# Patient Record
Sex: FEMALE | Race: WHITE | NOT HISPANIC OR LATINO | Employment: FULL TIME | URBAN - METROPOLITAN AREA
[De-identification: names, ages, dates, MRNs, and addresses within clinical notes are randomized per-mention and may not be internally consistent; named-entity substitution may affect disease eponyms.]

---

## 2018-11-07 ENCOUNTER — OFFICE VISIT (OUTPATIENT)
Dept: URGENT CARE | Facility: CLINIC | Age: 23
End: 2018-11-07
Payer: COMMERCIAL

## 2018-11-07 VITALS
HEIGHT: 66 IN | DIASTOLIC BLOOD PRESSURE: 80 MMHG | RESPIRATION RATE: 16 BRPM | WEIGHT: 220 LBS | HEART RATE: 76 BPM | SYSTOLIC BLOOD PRESSURE: 110 MMHG | TEMPERATURE: 98.3 F | BODY MASS INDEX: 35.36 KG/M2 | OXYGEN SATURATION: 94 %
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
## 2018-11-07 PROCEDURE — 99203 OFFICE O/P NEW LOW 30 MIN: CPT | Performed by: FAMILY MEDICINE

## 2018-11-07 ASSESSMENT — ENCOUNTER SYMPTOMS
FEVER: 0
DIARRHEA: 0
ABDOMINAL PAIN: 0
CONSTIPATION: 0
CHILLS: 0
NAUSEA: 0
VOMITING: 0
COUGH: 0
SHORTNESS OF BREATH: 0

## 2018-11-07 NOTE — PROGRESS NOTES
"Subjective:   Coco Casper is a 23 y.o. female who presents for Spider Bite (sensitive to the touch, painful )      HPI   Patient presents with new onset spider bite to her left lateral thigh near the buttock area that she noticed yesterday. This is a new problem.  She states it is tender to the touch, with no drainage and has almost completely scabbed over. Denies fever chills or itching to the area. Has not tried any OTC creams. Denies aggravating factors and rates as mild in severity.      Review of Systems   Constitutional: Negative for chills and fever.   Respiratory: Negative for cough and shortness of breath.    Cardiovascular: Negative for chest pain.   Gastrointestinal: Negative for abdominal pain, constipation, diarrhea, nausea and vomiting.   Skin: Negative for itching and rash.        Spider bite to left thigh   PMH:  has no past medical history on file.  MEDS:   Current Outpatient Prescriptions:   •  mupirocin (BACTROBAN) 2 % Ointment, Apply 1 Application to affected area(s) 2 times a day., Disp: 1 Tube, Rfl: 0  ALLERGIES: No Known Allergies  SURGHX: History reviewed. No pertinent surgical history.  SOCHX:  reports that she has never smoked. She has never used smokeless tobacco. She reports that she does not drink alcohol or use drugs.  FH: Family history was reviewed, no pertinent findings to report   Objective:   /80   Pulse 76   Temp 36.8 °C (98.3 °F)   Resp 16   Ht 1.676 m (5' 6\")   Wt 99.8 kg (220 lb)   SpO2 94%   BMI 35.51 kg/m²   Physical Exam   Constitutional: She appears well-developed and well-nourished. No distress.   HENT:   Head: Normocephalic.   Cardiovascular: Normal rate.    Pulmonary/Chest: Effort normal.   Skin: Capillary refill takes less than 2 seconds. No rash noted. She is not diaphoretic.        Small area of redness on left lateral thigh measuring approximately 1cm in diameter. No swelling, erythema, warmth or red streaking noted.  Minimal yellow drainage noted. " Area mildly tender to touch.         Assessment/Plan:   Assessment    1. Insect bite, initial encounter  - mupirocin (BACTROBAN) 2 % Ointment; Apply 1 Application to affected area(s) 2 times a day.  Dispense: 1 Tube; Refill: 0    Wound care instructions given:  Patient to apply cream to affected area twice daily and keep area covered with bandaid    Discussed s/s of worsening infection and when to return to office.    Follow up PRN    Differential diagnosis, natural history, supportive care, and indications for immediate follow-up discussed.     The case was discussed and reviewed with Dr Thomas during Landy DIEZ's training period.

## 2019-01-17 ENCOUNTER — HOSPITAL ENCOUNTER (EMERGENCY)
Facility: MEDICAL CENTER | Age: 24
End: 2019-01-17
Attending: EMERGENCY MEDICINE
Payer: COMMERCIAL

## 2019-01-17 VITALS
HEART RATE: 91 BPM | DIASTOLIC BLOOD PRESSURE: 89 MMHG | WEIGHT: 240.52 LBS | HEIGHT: 66 IN | SYSTOLIC BLOOD PRESSURE: 140 MMHG | OXYGEN SATURATION: 97 % | TEMPERATURE: 97.5 F | RESPIRATION RATE: 18 BRPM | BODY MASS INDEX: 38.66 KG/M2

## 2019-01-17 DIAGNOSIS — K29.00 ACUTE GASTRITIS WITHOUT HEMORRHAGE, UNSPECIFIED GASTRITIS TYPE: ICD-10-CM

## 2019-01-17 DIAGNOSIS — R10.13 EPIGASTRIC ABDOMINAL PAIN: ICD-10-CM

## 2019-01-17 LAB
ALBUMIN SERPL BCP-MCNC: 4.3 G/DL (ref 3.2–4.9)
ALBUMIN/GLOB SERPL: 1.3 G/DL
ALP SERPL-CCNC: 63 U/L (ref 30–99)
ALT SERPL-CCNC: 22 U/L (ref 2–50)
ANION GAP SERPL CALC-SCNC: 11 MMOL/L (ref 0–11.9)
APPEARANCE UR: CLEAR
AST SERPL-CCNC: 16 U/L (ref 12–45)
BACTERIA #/AREA URNS HPF: ABNORMAL /HPF
BASOPHILS # BLD AUTO: 0.4 % (ref 0–1.8)
BASOPHILS # BLD: 0.04 K/UL (ref 0–0.12)
BILIRUB SERPL-MCNC: 0.3 MG/DL (ref 0.1–1.5)
BILIRUB UR QL STRIP.AUTO: NEGATIVE
BUN SERPL-MCNC: 8 MG/DL (ref 8–22)
CALCIUM SERPL-MCNC: 9.7 MG/DL (ref 8.5–10.5)
CHLORIDE SERPL-SCNC: 103 MMOL/L (ref 96–112)
CO2 SERPL-SCNC: 26 MMOL/L (ref 20–33)
COLOR UR: YELLOW
CREAT SERPL-MCNC: 0.6 MG/DL (ref 0.5–1.4)
EOSINOPHIL # BLD AUTO: 0.26 K/UL (ref 0–0.51)
EOSINOPHIL NFR BLD: 2.4 % (ref 0–6.9)
EPI CELLS #/AREA URNS HPF: ABNORMAL /HPF
ERYTHROCYTE [DISTWIDTH] IN BLOOD BY AUTOMATED COUNT: 43.5 FL (ref 35.9–50)
GLOBULIN SER CALC-MCNC: 3.2 G/DL (ref 1.9–3.5)
GLUCOSE SERPL-MCNC: 75 MG/DL (ref 65–99)
GLUCOSE UR STRIP.AUTO-MCNC: NEGATIVE MG/DL
HCG SERPL QL: NEGATIVE
HCT VFR BLD AUTO: 43.5 % (ref 37–47)
HGB BLD-MCNC: 14.5 G/DL (ref 12–16)
HYALINE CASTS #/AREA URNS LPF: ABNORMAL /LPF
IMM GRANULOCYTES # BLD AUTO: 0.03 K/UL (ref 0–0.11)
IMM GRANULOCYTES NFR BLD AUTO: 0.3 % (ref 0–0.9)
KETONES UR STRIP.AUTO-MCNC: 15 MG/DL
LEUKOCYTE ESTERASE UR QL STRIP.AUTO: ABNORMAL
LIPASE SERPL-CCNC: 19 U/L (ref 11–82)
LYMPHOCYTES # BLD AUTO: 3.45 K/UL (ref 1–4.8)
LYMPHOCYTES NFR BLD: 31.4 % (ref 22–41)
MCH RBC QN AUTO: 29.4 PG (ref 27–33)
MCHC RBC AUTO-ENTMCNC: 33.3 G/DL (ref 33.6–35)
MCV RBC AUTO: 88.1 FL (ref 81.4–97.8)
MICRO URNS: ABNORMAL
MONOCYTES # BLD AUTO: 0.8 K/UL (ref 0–0.85)
MONOCYTES NFR BLD AUTO: 7.3 % (ref 0–13.4)
NEUTROPHILS # BLD AUTO: 6.41 K/UL (ref 2–7.15)
NEUTROPHILS NFR BLD: 58.2 % (ref 44–72)
NITRITE UR QL STRIP.AUTO: NEGATIVE
NRBC # BLD AUTO: 0 K/UL
NRBC BLD-RTO: 0 /100 WBC
PH UR STRIP.AUTO: 6 [PH]
PLATELET # BLD AUTO: 361 K/UL (ref 164–446)
PMV BLD AUTO: 9.6 FL (ref 9–12.9)
POTASSIUM SERPL-SCNC: 4 MMOL/L (ref 3.6–5.5)
PROT SERPL-MCNC: 7.5 G/DL (ref 6–8.2)
PROT UR QL STRIP: NEGATIVE MG/DL
RBC # BLD AUTO: 4.94 M/UL (ref 4.2–5.4)
RBC # URNS HPF: ABNORMAL /HPF
RBC UR QL AUTO: NEGATIVE
SODIUM SERPL-SCNC: 140 MMOL/L (ref 135–145)
SP GR UR STRIP.AUTO: 1.02
UROBILINOGEN UR STRIP.AUTO-MCNC: 0.2 MG/DL
WBC # BLD AUTO: 11 K/UL (ref 4.8–10.8)
WBC #/AREA URNS HPF: ABNORMAL /HPF

## 2019-01-17 PROCEDURE — A9270 NON-COVERED ITEM OR SERVICE: HCPCS | Performed by: EMERGENCY MEDICINE

## 2019-01-17 PROCEDURE — 81001 URINALYSIS AUTO W/SCOPE: CPT

## 2019-01-17 PROCEDURE — 85025 COMPLETE CBC W/AUTO DIFF WBC: CPT

## 2019-01-17 PROCEDURE — 36415 COLL VENOUS BLD VENIPUNCTURE: CPT

## 2019-01-17 PROCEDURE — 83690 ASSAY OF LIPASE: CPT

## 2019-01-17 PROCEDURE — 80053 COMPREHEN METABOLIC PANEL: CPT

## 2019-01-17 PROCEDURE — 99284 EMERGENCY DEPT VISIT MOD MDM: CPT

## 2019-01-17 PROCEDURE — 700102 HCHG RX REV CODE 250 W/ 637 OVERRIDE(OP): Performed by: EMERGENCY MEDICINE

## 2019-01-17 PROCEDURE — 84703 CHORIONIC GONADOTROPIN ASSAY: CPT

## 2019-01-17 RX ORDER — FAMOTIDINE 20 MG/1
40 TABLET, FILM COATED ORAL ONCE
Status: COMPLETED | OUTPATIENT
Start: 2019-01-17 | End: 2019-01-17

## 2019-01-17 RX ORDER — FAMOTIDINE 20 MG/1
20 TABLET, FILM COATED ORAL 2 TIMES DAILY
Qty: 60 TAB | Refills: 0 | Status: SHIPPED | OUTPATIENT
Start: 2019-01-17 | End: 2020-09-01

## 2019-01-17 RX ADMIN — FAMOTIDINE 40 MG: 20 TABLET ORAL at 19:23

## 2019-01-17 RX ADMIN — LIDOCAINE HYDROCHLORIDE 30 ML: 20 SOLUTION OROPHARYNGEAL at 19:23

## 2019-01-17 ASSESSMENT — PAIN SCALES - GENERAL: PAINLEVEL_OUTOF10: 6

## 2019-01-18 NOTE — ED NOTES
Discharging patient home. Verbalized understanding of discharge instructions, follow up appointments, and home care. All questions answered and all personal belongings with patient at time of discharge. Vital signs WNL. Patient given discharge information papers, prescription, and discharge assessment completed.

## 2019-01-18 NOTE — ED PROVIDER NOTES
"ED Provider Note    Scribed for Narayan Haider M.D. by Dmitri Finch. 1/17/2019  6:48 PM    Means of arrival: Walk in  History obtained from: Patient  History limited by: None    CHIEF COMPLAINT  Chief Complaint   Patient presents with   • Abdominal Pain     x 1 day, (+) N/V       HPI    Coco Casper is a 23 y.o. female presenting with intermittent epigastric pain described as \"sharp\" in quality onset yesterday that has progressively increased in severity. She states the pain is currently a 4/10 in severity, but was a 7/10 at the worst. It is exacerbated by eating. The patient endorses associated nausea and vomiting, with one episode of emesis today. She additionally reports to have mild diarrhea, but states this has been ongoing the past week since she was diagnosed with influenza, and claims the diarrhea has improved since onset. The patient reports no past abdominal surgeries. She has not recently traveled outside the country or consumed any new foods. The patient does not drink alcohol heavily and does not take large quantities of Ibuprofen. She denies vaginal discharge, rash, dysuria, or cough.     REVIEW OF SYSTEMS  See HPI for further details.   Pertinent positives include: epigastric pain, nausea, and vomiting.  Pertinent negative include: vaginal discharge, rash, dysuria, or cough.  10 + review of systems otherwise negative     PAST MEDICAL HISTORY    Influenza    SOCIAL HISTORY  Social History     Social History Main Topics   • Smoking status: Never Smoker   • Smokeless tobacco: Never Used   • Alcohol use No   • Drug use: No       SURGICAL HISTORY  patient denies any surgical history    CURRENT MEDICATIONS  Home Medications     Reviewed by Sandra Noriega R.N. (Registered Nurse) on 01/17/19 at 1617  Med List Status: Complete   Medication Last Dose Status   mupirocin (BACTROBAN) 2 % Ointment  Active                ALLERGIES  No Known Allergies    PHYSICAL EXAM  VITAL SIGNS: /90   Pulse 75   " "Temp 36.4 °C (97.5 °F) (Temporal)   Resp 16   Ht 1.676 m (5' 6\")   Wt 109.1 kg (240 lb 8.4 oz)   LMP  (Within Weeks)   SpO2 98%   BMI 38.82 kg/m²    SpO2: I interpret this pulse oximetry as normal  Constitutional: Well developed, Well nourished, No distress, Non-toxic appearance.   HENT: Normocephalic, Atraumatic, Bilateral external ears normal, Oropharynx moist, No oral exudates.   Eyes: PERRLA, EOMI, Conjunctiva normal, No discharge.   Neck: No tenderness, Supple, No stridor.   Lymphatic: No lymphadenopathy noted.   Cardiovascular: Normal heart rate, Normal rhythm.   Thorax & Lungs: Clear to auscultation bilaterally, No respiratory distress, No wheezing, No crackles.   Abdomen: Soft, Epigastric tenderness, No masses, No pulsatile masses, no peritoneal signs.  Skin: Warm, Dry, No erythema, No rash.   Extremities:, No edema No cyanosis.   Musculoskeletal: No tenderness to palpation or major deformities noted.  Intact distal pulses  Neurologic: Awake, alert. Moves all extremities spontaneously.  Psychiatric: Affect normal, Judgment normal, Mood normal.     DIAGNOSTIC STUDIES / PROCEDURES    LABORATORY  Results for orders placed or performed during the hospital encounter of 01/17/19   CBC WITH DIFFERENTIAL   Result Value Ref Range    WBC 11.0 (H) 4.8 - 10.8 K/uL    RBC 4.94 4.20 - 5.40 M/uL    Hemoglobin 14.5 12.0 - 16.0 g/dL    Hematocrit 43.5 37.0 - 47.0 %    MCV 88.1 81.4 - 97.8 fL    MCH 29.4 27.0 - 33.0 pg    MCHC 33.3 (L) 33.6 - 35.0 g/dL    RDW 43.5 35.9 - 50.0 fL    Platelet Count 361 164 - 446 K/uL    MPV 9.6 9.0 - 12.9 fL    Neutrophils-Polys 58.20 44.00 - 72.00 %    Lymphocytes 31.40 22.00 - 41.00 %    Monocytes 7.30 0.00 - 13.40 %    Eosinophils 2.40 0.00 - 6.90 %    Basophils 0.40 0.00 - 1.80 %    Immature Granulocytes 0.30 0.00 - 0.90 %    Nucleated RBC 0.00 /100 WBC    Neutrophils (Absolute) 6.41 2.00 - 7.15 K/uL    Lymphs (Absolute) 3.45 1.00 - 4.80 K/uL    Monos (Absolute) 0.80 0.00 - 0.85 K/uL "    Eos (Absolute) 0.26 0.00 - 0.51 K/uL    Baso (Absolute) 0.04 0.00 - 0.12 K/uL    Immature Granulocytes (abs) 0.03 0.00 - 0.11 K/uL    NRBC (Absolute) 0.00 K/uL   COMP METABOLIC PANEL   Result Value Ref Range    Sodium 140 135 - 145 mmol/L    Potassium 4.0 3.6 - 5.5 mmol/L    Chloride 103 96 - 112 mmol/L    Co2 26 20 - 33 mmol/L    Anion Gap 11.0 0.0 - 11.9    Glucose 75 65 - 99 mg/dL    Bun 8 8 - 22 mg/dL    Creatinine 0.60 0.50 - 1.40 mg/dL    Calcium 9.7 8.5 - 10.5 mg/dL    AST(SGOT) 16 12 - 45 U/L    ALT(SGPT) 22 2 - 50 U/L    Alkaline Phosphatase 63 30 - 99 U/L    Total Bilirubin 0.3 0.1 - 1.5 mg/dL    Albumin 4.3 3.2 - 4.9 g/dL    Total Protein 7.5 6.0 - 8.2 g/dL    Globulin 3.2 1.9 - 3.5 g/dL    A-G Ratio 1.3 g/dL   LIPASE   Result Value Ref Range    Lipase 19 11 - 82 U/L   HCG QUAL SERUM   Result Value Ref Range    Beta-Hcg Qualitative Serum Negative Negative   URINALYSIS,CULTURE IF INDICATED   Result Value Ref Range    Color Yellow     Character Clear     Specific Gravity 1.025 <1.035    Ph 6.0 5.0 - 8.0    Glucose Negative Negative mg/dL    Ketones 15 (A) Negative mg/dL    Protein Negative Negative mg/dL    Bilirubin Negative Negative    Urobilinogen, Urine 0.2 Negative    Nitrite Negative Negative    Leukocyte Esterase Trace (A) Negative    Occult Blood Negative Negative    Micro Urine Req Microscopic    ESTIMATED GFR   Result Value Ref Range    GFR If African American >60 >60 mL/min/1.73 m 2    GFR If Non African American >60 >60 mL/min/1.73 m 2   URINE MICROSCOPIC (W/UA)   Result Value Ref Range    WBC 0-2 /hpf    RBC 0-2 /hpf    Bacteria Few (A) None /hpf    Epithelial Cells Few /hpf    Hyaline Cast 3-5 (A) /lpf         CHART REVIEW  Pertinent medical chart information was reviewed and reveals: No recent pertinent encounters    COURSE & MEDICAL DECISION MAKING  Pertinent Labs & Imaging studies reviewed. (See chart for details)    Differential diagnoses include but not limited to: Gastritis, PUD,  "gastroneuritis, viral syndrome, infectious diarrhea, pancreatitis, cholecystitis, biliary colic, pyelonephritis, UTI, renal stone    6:48 PM - Patient seen and examined at bedside. Discussed plan of care, including lab work. Patient agrees to the plan of care. The patient will be medicated with GI cocktail 30 mL and Pepcid 40 mg. Ordered for Urine Microscopic, Estimated GFR, CBC with differential, CMP, Lipase, HCG Qual, and Urinalyisi to evaluate her symptoms.     8:05 PM - Reviewed lab results as shown above.     8:11 PM - Patient reevaluated at bedside. She is resting comfortably in bed and reports to be feeling better after GI cocktail. The patient was updated with reassuring lab results, and informed imaging is not indicated at this time. She will be discharged home with prescription for Pepcid. The patient is recommended to return to the ED for worsening pain, fevers, or any other concerning symptoms. She is understanding and agreeable to discharge.     Vitals:    01/17/19 1606 01/17/19 1615 01/17/19 1759 01/17/19 1935   BP: 147/100  139/90 140/89   Pulse: 91  75 91   Resp: 16   18   Temp: 36.4 °C (97.5 °F)      TempSrc: Temporal      SpO2: 96%  98% 97%   Weight:  109.1 kg (240 lb 8.4 oz)     Height: 1.676 m (5' 6\")          Medications   hyoscyamine-maalox plus-lidocaine viscous (GI COCKTAIL) oral susp 30 mL (30 mL Oral Given 1/17/19 1923)   famotidine (PEPCID) tablet 40 mg (40 mg Oral Given 1/17/19 1923)       HTN/IDDM FOLLOW UP:  The patient is referred to a primary physician for blood pressure management, diabetic screening, and for all other preventive health concerns    23-year-old previous healthy female presenting for epigastric pain.  Recent viral illness which is improving.  Currently tolerating p.o.  Vital signs and exam are very reassuring, mild epigastric pain, no signs of acute surgical abdomen.  No vomiting department.  Given gastritis treatment with much improved.  Labs are completely " unremarkable, no evidence of biliary disease.  Urinalysis no evidence of infection or stone.  Currently low suspicion for biliary disease with normal labs and lack of severe pain and very mild pain on exam.  Feeling much better wanting to go home.  Likely gastritis related to a viral syndrome.  Patient or guardian given strict returns precautions and care instructions.  Advised PCP follow-up in 1-2 days.  Patient or guardian expresses understanding and agrees to plan.    DISPOSITION  The patient will return for new or worsening symptoms and is stable at the time of discharge.    The patient is referred to a primary physician for blood pressure management, diabetic screening, and for all other preventative health concerns.    DISPOSITION:  Patient will be discharged home in stable condition.    FOLLOW UP:  Your primary doctor    Schedule an appointment as soon as possible for a visit in 2 days        OUTPATIENT MEDICATIONS:  Discharge Medication List as of 1/17/2019  8:30 PM      START taking these medications    Details   famotidine (PEPCID) 20 MG Tab Take 1 Tab by mouth 2 times a day., Disp-60 Tab, R-0, Print Rx Paper             FINAL IMPRESSION  Visit Diagnoses     ICD-10-CM   1. Epigastric abdominal pain R10.13   2. Acute gastritis without hemorrhage, unspecified gastritis type K29.00        Dmitri BILLINGSLEY (Vikki), am scribing for, and in the presence of, Narayan Haider M.D..    Electronically signed by: Dmitri Finch (Vikki), 1/17/2019    Narayan BILLINGSLEY M.D. personally performed the services described in this documentation, as scribed by Dmitri Finch in my presence, and it is both accurate and complete.    C.    The note accurately reflects work and decisions made by me.  Narayan Haider  1/18/2019  8:53 PM

## 2019-01-18 NOTE — ED TRIAGE NOTES
".  Chief Complaint   Patient presents with   • Abdominal Pain     x 1 day, (+) N/V     ./100   Pulse 91   Temp 36.4 °C (97.5 °F) (Temporal)   Resp 16   Ht 1.676 m (5' 6\")   Wt 109.1 kg (240 lb 8.4 oz)   LMP  (Within Weeks)   SpO2 96%   BMI 38.82 kg/m²     Ambulatory to triage with above complaints, pain worsens after eating, abdominal pain protocol initiated, educated on triage process, given urine specimen cup and clean catch instructions, to ED tech for blood draw.    "

## 2020-01-09 ENCOUNTER — OFFICE VISIT (OUTPATIENT)
Dept: URGENT CARE | Facility: CLINIC | Age: 25
End: 2020-01-09
Payer: COMMERCIAL

## 2020-01-09 VITALS
OXYGEN SATURATION: 99 % | BODY MASS INDEX: 38.57 KG/M2 | DIASTOLIC BLOOD PRESSURE: 74 MMHG | HEIGHT: 66 IN | RESPIRATION RATE: 16 BRPM | TEMPERATURE: 99.2 F | HEART RATE: 100 BPM | WEIGHT: 240 LBS | SYSTOLIC BLOOD PRESSURE: 122 MMHG

## 2020-01-09 DIAGNOSIS — J98.8 RTI (RESPIRATORY TRACT INFECTION): ICD-10-CM

## 2020-01-09 DIAGNOSIS — R05.9 COUGH: ICD-10-CM

## 2020-01-09 PROCEDURE — 99214 OFFICE O/P EST MOD 30 MIN: CPT | Performed by: NURSE PRACTITIONER

## 2020-01-09 RX ORDER — AZITHROMYCIN 250 MG/1
TABLET, FILM COATED ORAL
Qty: 6 TAB | Refills: 0 | Status: SHIPPED | OUTPATIENT
Start: 2020-01-09 | End: 2020-01-14

## 2020-01-09 RX ORDER — BENZONATATE 200 MG/1
200 CAPSULE ORAL EVERY 8 HOURS PRN
Qty: 30 CAP | Refills: 0 | Status: SHIPPED | OUTPATIENT
Start: 2020-01-09 | End: 2020-09-01

## 2020-01-09 ASSESSMENT — ENCOUNTER SYMPTOMS
SPUTUM PRODUCTION: 1
SORE THROAT: 1
COUGH: 1
CARDIOVASCULAR NEGATIVE: 1
SHORTNESS OF BREATH: 0
FEVER: 0

## 2020-01-09 NOTE — PROGRESS NOTES
Subjective:     Coco Casper is a 24 y.o. female who presents for Cough (x1 week, productive cough, chest congestion, sinus pressure and congestion, sore throat, )       Cough   This is a new problem. Episode onset: 1 week ago. The problem has been gradually worsening. The cough is productive of sputum. Associated symptoms include a sore throat. Pertinent negatives include no fever or shortness of breath. Nothing aggravates the symptoms. 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 She has tried OTC cough suppressant for the symptoms. The treatment provided no relief. There is no history of asthma.      PMH:  has no past medical history on file.    MEDS:   Current Outpatient Medications:   •  azithromycin (ZITHROMAX) 250 MG Tab, Take 2 tabs by mouth once today, then one tab by mouth once daily days 2-5., Disp: 6 Tab, Rfl: 0  •  benzonatate (TESSALON) 200 MG capsule, Take 1 Cap by mouth every 8 hours as needed for Cough., Disp: 30 Cap, Rfl: 0  •  famotidine (PEPCID) 20 MG Tab, Take 1 Tab by mouth 2 times a day. (Patient not taking: Reported on 1/9/2020), Disp: 60 Tab, Rfl: 0  •  mupirocin (BACTROBAN) 2 % Ointment, Apply 1 Application to affected area(s) 2 times a day. (Patient not taking: Reported on 1/9/2020), Disp: 1 Tube, Rfl: 0    ALLERGIES: No Known Allergies    SURGHX: History reviewed. No pertinent surgical history.    SOCHX:  reports that she has never smoked. She has never used smokeless tobacco. She reports that she does not drink alcohol or use drugs.     FH: Reviewed with patient, not pertinent to this visit.    Review of Systems   Constitutional: Positive for malaise/fatigue. Negative for fever.   HENT: Positive for congestion and sore throat.    Respiratory: Positive for cough and sputum production. Negative for shortness of breath.    Cardiovascular: Negative.    All other systems reviewed and are negative.    Objective:     /74   Pulse 100   Temp 37.3 °C (99.2 °F) (Temporal)   Resp 16   Ht 1.676 m (5'  "6\")   Wt 108.9 kg (240 lb)   SpO2 99%   BMI 38.74 kg/m²     Physical Exam  Vitals signs reviewed.   Constitutional:       General: She is not in acute distress.     Appearance: She is well-developed. She is not toxic-appearing or diaphoretic.   HENT:      Head: Normocephalic.      Right Ear: Tympanic membrane and external ear normal.      Left Ear: Tympanic membrane and external ear normal.      Nose: Mucosal edema and rhinorrhea present.      Mouth/Throat:      Mouth: Mucous membranes are moist.      Pharynx: Uvula midline. Pharyngeal swelling and posterior oropharyngeal erythema present.   Eyes:      Extraocular Movements: Extraocular movements intact.      Conjunctiva/sclera: Conjunctivae normal.      Pupils: Pupils are equal, round, and reactive to light.   Neck:      Musculoskeletal: Normal range of motion.   Cardiovascular:      Rate and Rhythm: Normal rate and regular rhythm.      Pulses: Normal pulses.      Heart sounds: Normal heart sounds.   Pulmonary:      Effort: Pulmonary effort is normal. No respiratory distress.      Breath sounds: Normal breath sounds. No decreased breath sounds.   Abdominal:      General: Bowel sounds are normal.      Palpations: Abdomen is soft.      Tenderness: There is no tenderness.   Musculoskeletal: Normal range of motion.         General: No deformity.   Lymphadenopathy:      Cervical: No cervical adenopathy.   Skin:     General: Skin is warm and dry.      Capillary Refill: Capillary refill takes less than 2 seconds.      Coloration: Skin is not pale.   Neurological:      Mental Status: She is alert and oriented to person, place, and time.      Sensory: No sensory deficit.      Coordination: Coordination normal.   Psychiatric:         Behavior: Behavior normal. Behavior is cooperative.          Assessment/Plan:     1. RTI (respiratory tract infection)  - azithromycin (ZITHROMAX) 250 MG Tab; Take 2 tabs by mouth once today, then one tab by mouth once daily days 2-5.  " Dispense: 6 Tab; Refill: 0    2. Cough  - benzonatate (TESSALON) 200 MG capsule; Take 1 Cap by mouth every 8 hours as needed for Cough.  Dispense: 30 Cap; Refill: 0    Various differentials discussed including allergic vs viral vs bacterial etiologies.    Patient concerned as it has been 1 week and her symptoms have actually been getting worse and is concerned of complications. Patient reports she will be traveling in 3 days.    Rx as above sent electronically for cough and coverage/prevention of early bacterial process.    Discussed close monitoring and supportive measures including increasing fluids and rest as well as OTC symptom management per 's instructions.    Vital signs stable, afebrile, asymptomatic, no acute distress.    Warning signs reviewed. Return precautions discussed.    Patient advised to: Return for 1) Symptoms don't improve or worsen, or go to ER, 2) Follow up with primary care in 7-10 days.    Differential diagnosis, natural history, supportive care, and indications for immediate follow-up discussed. All questions answered. Patient agrees with the plan of care.

## 2020-01-09 NOTE — PATIENT INSTRUCTIONS

## 2020-09-01 ENCOUNTER — HOSPITAL ENCOUNTER (EMERGENCY)
Facility: MEDICAL CENTER | Age: 25
End: 2020-09-01
Attending: EMERGENCY MEDICINE
Payer: COMMERCIAL

## 2020-09-01 ENCOUNTER — APPOINTMENT (OUTPATIENT)
Dept: RADIOLOGY | Facility: MEDICAL CENTER | Age: 25
End: 2020-09-01
Attending: EMERGENCY MEDICINE
Payer: COMMERCIAL

## 2020-09-01 VITALS
SYSTOLIC BLOOD PRESSURE: 138 MMHG | WEIGHT: 230 LBS | BODY MASS INDEX: 38.32 KG/M2 | HEART RATE: 70 BPM | TEMPERATURE: 97.1 F | HEIGHT: 65 IN | RESPIRATION RATE: 18 BRPM | OXYGEN SATURATION: 94 % | DIASTOLIC BLOOD PRESSURE: 65 MMHG

## 2020-09-01 DIAGNOSIS — S82.851A CLOSED TRIMALLEOLAR FRACTURE OF RIGHT ANKLE, INITIAL ENCOUNTER: ICD-10-CM

## 2020-09-01 LAB
COVID ORDER STATUS COVID19: NORMAL
SARS-COV-2 RNA RESP QL NAA+PROBE: NOTDETECTED
SPECIMEN SOURCE: NORMAL

## 2020-09-01 PROCEDURE — 99285 EMERGENCY DEPT VISIT HI MDM: CPT

## 2020-09-01 PROCEDURE — 73600 X-RAY EXAM OF ANKLE: CPT | Mod: RT

## 2020-09-01 PROCEDURE — 73610 X-RAY EXAM OF ANKLE: CPT | Mod: RT

## 2020-09-01 PROCEDURE — 94770 HCHG CO2 EXPIRED GAS DETERMINATION: CPT

## 2020-09-01 PROCEDURE — 99152 MOD SED SAME PHYS/QHP 5/>YRS: CPT

## 2020-09-01 PROCEDURE — U0003 INFECTIOUS AGENT DETECTION BY NUCLEIC ACID (DNA OR RNA); SEVERE ACUTE RESPIRATORY SYNDROME CORONAVIRUS 2 (SARS-COV-2) (CORONAVIRUS DISEASE [COVID-19]), AMPLIFIED PROBE TECHNIQUE, MAKING USE OF HIGH THROUGHPUT TECHNOLOGIES AS DESCRIBED BY CMS-2020-01-R: HCPCS

## 2020-09-01 PROCEDURE — 96374 THER/PROPH/DIAG INJ IV PUSH: CPT

## 2020-09-01 PROCEDURE — 96376 TX/PRO/DX INJ SAME DRUG ADON: CPT

## 2020-09-01 PROCEDURE — C9803 HOPD COVID-19 SPEC COLLECT: HCPCS | Performed by: EMERGENCY MEDICINE

## 2020-09-01 PROCEDURE — 27818 TREATMENT OF ANKLE FRACTURE: CPT

## 2020-09-01 PROCEDURE — 700111 HCHG RX REV CODE 636 W/ 250 OVERRIDE (IP): Performed by: EMERGENCY MEDICINE

## 2020-09-01 PROCEDURE — 306637 HCHG MISC ORTHO ITEM RC 0274

## 2020-09-01 PROCEDURE — 73590 X-RAY EXAM OF LOWER LEG: CPT | Mod: RT

## 2020-09-01 PROCEDURE — 27840 TREAT ANKLE DISLOCATION: CPT

## 2020-09-01 RX ORDER — PROPOFOL 10 MG/ML
INJECTION, EMULSION INTRAVENOUS
Status: COMPLETED | OUTPATIENT
Start: 2020-09-01 | End: 2020-09-01

## 2020-09-01 RX ORDER — MORPHINE SULFATE 4 MG/ML
4 INJECTION, SOLUTION INTRAMUSCULAR; INTRAVENOUS ONCE
Status: COMPLETED | OUTPATIENT
Start: 2020-09-01 | End: 2020-09-01

## 2020-09-01 RX ORDER — OXYCODONE HYDROCHLORIDE AND ACETAMINOPHEN 5; 325 MG/1; MG/1
1 TABLET ORAL EVERY 4 HOURS PRN
Qty: 15 TAB | Refills: 0 | Status: SHIPPED | OUTPATIENT
Start: 2020-09-01 | End: 2020-09-04

## 2020-09-01 RX ORDER — MORPHINE SULFATE 4 MG/ML
INJECTION, SOLUTION INTRAMUSCULAR; INTRAVENOUS
Status: COMPLETED | OUTPATIENT
Start: 2020-09-01 | End: 2020-09-01

## 2020-09-01 RX ORDER — OXYCODONE HYDROCHLORIDE AND ACETAMINOPHEN 5; 325 MG/1; MG/1
1 TABLET ORAL EVERY 4 HOURS PRN
Qty: 15 TAB | Refills: 0 | Status: SHIPPED | OUTPATIENT
Start: 2020-09-01 | End: 2020-09-01 | Stop reason: SDUPTHER

## 2020-09-01 RX ORDER — MORPHINE SULFATE 4 MG/ML
INJECTION, SOLUTION INTRAMUSCULAR; INTRAVENOUS
Status: COMPLETED
Start: 2020-09-01 | End: 2020-09-01

## 2020-09-01 RX ADMIN — PROPOFOL 100 MG: 10 INJECTION, EMULSION INTRAVENOUS at 14:33

## 2020-09-01 RX ADMIN — MORPHINE SULFATE 4 MG: 4 INJECTION INTRAVENOUS at 13:51

## 2020-09-01 RX ADMIN — PROPOFOL 20 MG: 10 INJECTION, EMULSION INTRAVENOUS at 14:29

## 2020-09-01 RX ADMIN — PROPOFOL 50 MG: 10 INJECTION, EMULSION INTRAVENOUS at 14:28

## 2020-09-01 RX ADMIN — PROPOFOL 30 MG: 10 INJECTION, EMULSION INTRAVENOUS at 14:30

## 2020-09-01 RX ADMIN — MORPHINE SULFATE 4 MG: 4 INJECTION INTRAVENOUS at 14:35

## 2020-09-01 ASSESSMENT — FIBROSIS 4 INDEX: FIB4 SCORE: 0.26

## 2020-09-01 NOTE — ED TRIAGE NOTES
24 y/o female bib ambulance from the [facility] after pt fell today while hiking rolling her right ankle. + deformity noted, cms intact. Pt was given 100 mcg fentanyl in route to the ED. Ice pack in place.

## 2020-09-01 NOTE — ED NOTES
D/C instructions provided to patient at bedside, verbalizes understanding and states plans for follow-up with Ortho as recommended.   Script electronically sent and explained. Narcotic consent signed and in D/C instructions.   IV removed and dressed.   All belongings accounted for, all questions answered at this time.   Pt wheeled to lobby, taken home by mother.

## 2020-09-01 NOTE — RESPIRATORY CARE
Conscious Sedation Respiratory Update       O2 (LPM): 2.5 (09/01/20 1437)   ETCO2: 22-26    Events/Summary/Plan: present for conscious sedation (09/01/20 1437)

## 2020-09-01 NOTE — ED NOTES
Report received from Az DAILEY. Assumed care of patient. Call light within reach. White board updated.

## 2020-09-01 NOTE — ED NOTES
Moderate sedation at bedside for closed reduction with Dr. Morgan. Consent signed and on chart. RT, RN, RT and splintcart to bedside. Given total of propofol 200mg and morphine 4mg. No episodes of desaturation or hypotension. Following procedure, she is alert and oriented. VSS. Mother at bedside and call light within reach.

## 2020-09-02 NOTE — ED PROVIDER NOTES
ED Provider Note    CHIEF COMPLAINT  Chief Complaint   Patient presents with   • T-5000 Ankle Injury       HPI  Coco Casper is a 25 y.o. female who presents to the emergency department with complaint of right ankle injury.  She was hiking earlier today, she slipped going downhill resulting in her twisting her ankle and felt a pop.  After that she had severe pain to her right medial lateral ankle.  Denies loss of sensation or strength of her foot, denies knee pain, hip pain.  REVIEW OF SYSTEMS  Positives as above. Pertinent negatives include fever, shortness of breath, cough, nausea, vomiting, loss of sensation or strength to arms or legs.  All other review of systems are negative    PAST MEDICAL HISTORY  History reviewed. No pertinent past medical history.    FAMILY HISTORY  Noncontributory    SOCIAL HISTORY  Social History     Socioeconomic History   • Marital status: Single     Spouse name: Not on file   • Number of children: Not on file   • Years of education: Not on file   • Highest education level: Not on file   Occupational History   • Not on file   Social Needs   • Financial resource strain: Not on file   • Food insecurity     Worry: Not on file     Inability: Not on file   • Transportation needs     Medical: Not on file     Non-medical: Not on file   Tobacco Use   • Smoking status: Never Smoker   • Smokeless tobacco: Never Used   Substance and Sexual Activity   • Alcohol use: Yes     Comment: socially   • Drug use: Yes     Comment: rare, marijuana   • Sexual activity: Not on file   Lifestyle   • Physical activity     Days per week: Not on file     Minutes per session: Not on file   • Stress: Not on file   Relationships   • Social connections     Talks on phone: Not on file     Gets together: Not on file     Attends [employer] witness service: Not on file     Active member of club or organization: Not on file     Attends meetings of clubs or organizations: Not on file     Relationship status: Not on file   •  "Intimate partner violence     Fear of current or ex partner: Not on file     Emotionally abused: Not on file     Physically abused: Not on file     Forced sexual activity: Not on file   Other Topics Concern   • Not on file   Social History Narrative   • Not on file       SURGICAL HISTORY  History reviewed. No pertinent surgical history.    CURRENT MEDICATIONS  Home Medications     Reviewed by Rasheed Licona R.N. (Registered Nurse) on 09/01/20 at 1605  Med List Status: Complete   Medication Last Dose Status        Patient George Taking any Medications                       ALLERGIES  No Known Allergies    PHYSICAL EXAM  VITAL SIGNS: /65   Pulse 70   Temp 36.2 °C (97.1 °F) (Temporal)   Resp 18   Ht 1.651 m (5' 5\")   Wt 104.3 kg (230 lb)   SpO2 94%   BMI 38.27 kg/m²      Constitutional: Well developed, Well nourished, moderate acute distress, Non-toxic appearance.   Eyes: PERRLA, EOMI, Conjunctiva normal, No discharge.   Cardiovascular: Normal heart rate, Normal rhythm, No murmurs, No rubs, No gallops, and intact distal pulses.   Thorax & Lungs:  No respiratory distress, no rales, no rhonchi, No wheezing, No chest wall tenderness.   Abdomen: Bowel sounds normal, Soft, No tenderness, No guarding, No rebound, No pulsatile masses.   Skin: Warm, Dry, ecchymosis to the right medial malleolus   extremities: The right lower extremity at the ankle there is evidence of significant edema and deformity to the medial lateral malleoli region, dorsalis pedis and posterior tibial pulses are brisk, no fibular head tenderness  Neurologic: Alert & oriented x 3, and strength intact upper and lower extremities bilaterally  Psychiatric: Affect normal for clinical presentation.      RADIOLOGY/PROCEDURES  DX-ANKLE 3+ VIEWS RIGHT   Final Result      Tibiotalar alignment is improved compared to prior.      Comminuted fracture of the distal fibula is mildly displaced.      Fracture of the distal tibia extending to the medial " malleolus. Posterior malleolus fracture is also seen.      Soft tissue swelling about the lower leg and ankle.         DX-TIBIA AND FIBULA RIGHT   Final Result      1.  The proximal right fibula and tibia are intact.      DX-ANKLE 2- VIEWS RIGHT   Final Result      1.  There is extensive distal tibial fracture with multiple intra-articular components. There is associated tibiotalar joint dislocation.   2.  There is a comminuted angulated distal right fibular diaphyseal fracture.        Conscious Sedation Procedure Note    Indication: fracture dislocation    Consent: I have discussed with the patient and/or the patient representative the indication, alternatives, and the possible risks and/or complications of the planned procedure and the anesthesia methods. The patient and/or patient representative appear to understand and agree to proceed.    Physician Involvement: The attending physician was present and supervising this procedure.    Pre-Sedation Documentation and Exam: I have personally completed a history, physical exam & review of systems for this patient (see notes).  Airway Assessment: Mallampati Class I - (soft palate, fauces, uvula & anterior/posterior tonsillar pillars are visible)  f3  Prior History of Anesthesia Complications: none    ASA Classification: Class 1 - A normal healthy patient    Sedation/ Anesthesia Plan: intravenous sedation    Medications Used: propofol 200 mg intravenously    Monitoring and Safety: The patient was placed on a cardiac monitor and vital signs, pulse oximetry and level of consciousness were continuously evaluated throughout the procedure. The patient was closely monitored until recovery from the medications was complete and the patient had returned to baseline status. Respiratory therapy was on standby at all times during the procedure.      (The following sections must be completed)  Post-Sedation Vital Signs: Vital signs were reviewed and were stable after the procedure  (see flow sheet for vitals)            Intraservice Time: Greater than 10 minutes    Post-Sedation Exam: Lungs: clear and Cardiovascular: normal           Complications: none    I provided both the sedation and procedure, a nurse was present at the bedside for the entire procedure.     Insert reduction procedure.  Had procedural sedation as above, timeout was completed for reduction of the right ankle fracture dislocation.  Using axial force with the medial displacement of the ankle joint, the ankle was reduced without difficulty.  A 3 sided short leg splint was placed.  The patient had distal pulses that are brisk.      COURSE & MEDICAL DECISION MAKING  Pertinent Labs & Imaging studies reviewed. (See chart for details)  Is a pleasant 25-year-old female with a trimalleolar fracture dislocation of the right ankle Irvin type C fracture.  The patient in no evidence of fibular head fracture.  The patient underwent procedural sedation as above as well as fracture dislocation following timeout.  On reevaluation, the patient is neurovascularly intact with no evidence of compartment syndrome, no evidence of any neurological vascular deficits.  I discussed the patient with Dr. Saul who states the patient should follow-up as an outpatient and he need to reduce the fracture.  For this reason, this was complete as above.  The patient will be discharged with pain medication and strict return precautions have been given.    In prescribing controlled substances to this patient, I certify that I have obtained and reviewed the medical history of Coco Casper. I have also made a good leona effort to obtain applicable records from other providers who have treated the patient and records did not demonstrate any increased risk of substance abuse that would prevent me from prescribing controlled substances.     I have conducted a physical exam and documented it. I have reviewed Ms. Casper’s prescription history as maintained by the  [place] Prescription Monitoring Program.     I have assessed the patient’s risk for abuse, dependency, and addiction using the validated Opioid Risk Tool available at https://www.mdcalc.com/jkfidp-gqpf-anev-ort-narcotic-abuse.     Given the above, I believe the benefits of controlled substance therapy outweigh the risks. The reasons for prescribing controlled substances include non-narcotic, oral analgesic alternatives have been inadequate for pain control. Accordingly, I have discussed the risk and benefits, treatment plan, and alternative therapies with the patient.         Discharge Medication List as of 9/1/2020  4:25 PM      Percocet 5/325 #15 for 3-day supply.  FINAL IMPRESSION     1. Closed trimalleolar fracture of right ankle, initial encounter Active   2.  Procedural sedation  3.  Fracture dislocation reduction of the right ankle      DISPOSITION:  Patient will be discharged home in stable condition.    FOLLOW UP:  [facility], Emergency Dept  [address] [address]  [phone]    If symptoms worsen    Gordon Saul M.D.  [address] [facility] [address]  [phone]    Schedule an appointment as soon as possible for a visit in 1 day        Electronically signed by: Vance Morgan D.O., 9/1/2020 8:20 PM

## 2020-09-03 ENCOUNTER — HOSPITAL ENCOUNTER (OUTPATIENT)
Facility: MEDICAL CENTER | Age: 25
End: 2020-09-03
Attending: ORTHOPAEDIC SURGERY | Admitting: ORTHOPAEDIC SURGERY
Payer: COMMERCIAL

## 2020-09-03 ENCOUNTER — ANESTHESIA (OUTPATIENT)
Dept: SURGERY | Facility: MEDICAL CENTER | Age: 25
End: 2020-09-03
Payer: COMMERCIAL

## 2020-09-03 ENCOUNTER — APPOINTMENT (OUTPATIENT)
Dept: RADIOLOGY | Facility: MEDICAL CENTER | Age: 25
End: 2020-09-03
Attending: ORTHOPAEDIC SURGERY
Payer: COMMERCIAL

## 2020-09-03 ENCOUNTER — ANESTHESIA EVENT (OUTPATIENT)
Dept: SURGERY | Facility: MEDICAL CENTER | Age: 25
End: 2020-09-03
Payer: COMMERCIAL

## 2020-09-03 VITALS
BODY MASS INDEX: 39.08 KG/M2 | DIASTOLIC BLOOD PRESSURE: 70 MMHG | WEIGHT: 234.57 LBS | HEIGHT: 65 IN | SYSTOLIC BLOOD PRESSURE: 115 MMHG | TEMPERATURE: 97.2 F | OXYGEN SATURATION: 94 % | HEART RATE: 116 BPM | RESPIRATION RATE: 16 BRPM

## 2020-09-03 LAB — HCG UR QL: NEGATIVE

## 2020-09-03 PROCEDURE — 500881 HCHG PACK, EXTREMITY: Performed by: ORTHOPAEDIC SURGERY

## 2020-09-03 PROCEDURE — 160025 RECOVERY II MINUTES (STATS): Performed by: ORTHOPAEDIC SURGERY

## 2020-09-03 PROCEDURE — 700102 HCHG RX REV CODE 250 W/ 637 OVERRIDE(OP): Performed by: ANESTHESIOLOGY

## 2020-09-03 PROCEDURE — 81025 URINE PREGNANCY TEST: CPT

## 2020-09-03 PROCEDURE — 64447 NJX AA&/STRD FEMORAL NRV IMG: CPT | Performed by: ORTHOPAEDIC SURGERY

## 2020-09-03 PROCEDURE — 700101 HCHG RX REV CODE 250: Performed by: ANESTHESIOLOGY

## 2020-09-03 PROCEDURE — 73610 X-RAY EXAM OF ANKLE: CPT | Mod: RT

## 2020-09-03 PROCEDURE — 160048 HCHG OR STATISTICAL LEVEL 1-5: Performed by: ORTHOPAEDIC SURGERY

## 2020-09-03 PROCEDURE — 160041 HCHG SURGERY MINUTES - EA ADDL 1 MIN LEVEL 4: Performed by: ORTHOPAEDIC SURGERY

## 2020-09-03 PROCEDURE — A9270 NON-COVERED ITEM OR SERVICE: HCPCS | Performed by: ORTHOPAEDIC SURGERY

## 2020-09-03 PROCEDURE — A6454 SELF-ADHER BAND W>=3" <5"/YD: HCPCS | Performed by: ORTHOPAEDIC SURGERY

## 2020-09-03 PROCEDURE — 700102 HCHG RX REV CODE 250 W/ 637 OVERRIDE(OP): Performed by: ORTHOPAEDIC SURGERY

## 2020-09-03 PROCEDURE — 501838 HCHG SUTURE GENERAL: Performed by: ORTHOPAEDIC SURGERY

## 2020-09-03 PROCEDURE — 700101 HCHG RX REV CODE 250: Performed by: ORTHOPAEDIC SURGERY

## 2020-09-03 PROCEDURE — 64445 NJX AA&/STRD SCIATIC NRV IMG: CPT | Performed by: ORTHOPAEDIC SURGERY

## 2020-09-03 PROCEDURE — C1713 ANCHOR/SCREW BN/BN,TIS/BN: HCPCS | Performed by: ORTHOPAEDIC SURGERY

## 2020-09-03 PROCEDURE — 160029 HCHG SURGERY MINUTES - 1ST 30 MINS LEVEL 4: Performed by: ORTHOPAEDIC SURGERY

## 2020-09-03 PROCEDURE — A9270 NON-COVERED ITEM OR SERVICE: HCPCS | Performed by: ANESTHESIOLOGY

## 2020-09-03 PROCEDURE — 700111 HCHG RX REV CODE 636 W/ 250 OVERRIDE (IP): Performed by: ANESTHESIOLOGY

## 2020-09-03 PROCEDURE — 700105 HCHG RX REV CODE 258: Performed by: ORTHOPAEDIC SURGERY

## 2020-09-03 PROCEDURE — 160035 HCHG PACU - 1ST 60 MINS PHASE I: Performed by: ORTHOPAEDIC SURGERY

## 2020-09-03 PROCEDURE — 160002 HCHG RECOVERY MINUTES (STAT): Performed by: ORTHOPAEDIC SURGERY

## 2020-09-03 PROCEDURE — 160046 HCHG PACU - 1ST 60 MINS PHASE II: Performed by: ORTHOPAEDIC SURGERY

## 2020-09-03 PROCEDURE — 160009 HCHG ANES TIME/MIN: Performed by: ORTHOPAEDIC SURGERY

## 2020-09-03 DEVICE — SCREW CORT 3.5X14MM ST HEX (4TX6+1TX4+1TX3=31)(SDS=22): Type: IMPLANTABLE DEVICE | Site: ANKLE | Status: FUNCTIONAL

## 2020-09-03 DEVICE — WIRE K- SMOOTH .062 - (3TX6=18): Type: IMPLANTABLE DEVICE | Site: ANKLE | Status: FUNCTIONAL

## 2020-09-03 DEVICE — SCREW LOCK 3.5X14MM ST T15 - (4SFLX6+LPPELVX4=28): Type: IMPLANTABLE DEVICE | Site: ANKLE | Status: FUNCTIONAL

## 2020-09-03 DEVICE — SCREW CORT 3.5X55MM ST HEX - (4SFLX6+MDFTX3=27)(SDS=4): Type: IMPLANTABLE DEVICE | Site: ANKLE | Status: FUNCTIONAL

## 2020-09-03 DEVICE — SCREW CORT 3.5X50MM ST HEX (4TX6+1T3=27)(SDS=18): Type: IMPLANTABLE DEVICE | Site: ANKLE | Status: FUNCTIONAL

## 2020-09-03 DEVICE — WIRE K- SMOOTH .045 - (3TX6=18): Type: IMPLANTABLE DEVICE | Site: ANKLE | Status: FUNCTIONAL

## 2020-09-03 DEVICE — IMPLANTABLE DEVICE: Type: IMPLANTABLE DEVICE | Site: ANKLE | Status: FUNCTIONAL

## 2020-09-03 RX ORDER — LORAZEPAM 2 MG/ML
0.5 INJECTION INTRAMUSCULAR
Status: DISCONTINUED | OUTPATIENT
Start: 2020-09-03 | End: 2020-09-03 | Stop reason: HOSPADM

## 2020-09-03 RX ORDER — ACETAMINOPHEN 500 MG
1000 TABLET ORAL ONCE
Status: COMPLETED | OUTPATIENT
Start: 2020-09-03 | End: 2020-09-03

## 2020-09-03 RX ORDER — MIDAZOLAM HYDROCHLORIDE 1 MG/ML
INJECTION INTRAMUSCULAR; INTRAVENOUS PRN
Status: DISCONTINUED | OUTPATIENT
Start: 2020-09-03 | End: 2020-09-03 | Stop reason: SURG

## 2020-09-03 RX ORDER — BUPIVACAINE HYDROCHLORIDE 5 MG/ML
INJECTION, SOLUTION EPIDURAL; INTRACAUDAL
Status: COMPLETED | OUTPATIENT
Start: 2020-09-03 | End: 2020-09-03

## 2020-09-03 RX ORDER — GABAPENTIN 300 MG/1
600 CAPSULE ORAL ONCE
Status: COMPLETED | OUTPATIENT
Start: 2020-09-03 | End: 2020-09-03

## 2020-09-03 RX ORDER — ONDANSETRON 2 MG/ML
4 INJECTION INTRAMUSCULAR; INTRAVENOUS
Status: DISCONTINUED | OUTPATIENT
Start: 2020-09-03 | End: 2020-09-03 | Stop reason: HOSPADM

## 2020-09-03 RX ORDER — HALOPERIDOL 5 MG/ML
1 INJECTION INTRAMUSCULAR
Status: DISCONTINUED | OUTPATIENT
Start: 2020-09-03 | End: 2020-09-03 | Stop reason: HOSPADM

## 2020-09-03 RX ORDER — LABETALOL HYDROCHLORIDE 5 MG/ML
5 INJECTION, SOLUTION INTRAVENOUS
Status: DISCONTINUED | OUTPATIENT
Start: 2020-09-03 | End: 2020-09-03 | Stop reason: HOSPADM

## 2020-09-03 RX ORDER — SODIUM CHLORIDE, SODIUM LACTATE, POTASSIUM CHLORIDE, CALCIUM CHLORIDE 600; 310; 30; 20 MG/100ML; MG/100ML; MG/100ML; MG/100ML
INJECTION, SOLUTION INTRAVENOUS CONTINUOUS
Status: DISCONTINUED | OUTPATIENT
Start: 2020-09-03 | End: 2020-09-03 | Stop reason: HOSPADM

## 2020-09-03 RX ORDER — MAGNESIUM SULFATE HEPTAHYDRATE 40 MG/ML
INJECTION, SOLUTION INTRAVENOUS PRN
Status: DISCONTINUED | OUTPATIENT
Start: 2020-09-03 | End: 2020-09-03 | Stop reason: SURG

## 2020-09-03 RX ORDER — CEFAZOLIN SODIUM 1 G/3ML
INJECTION, POWDER, FOR SOLUTION INTRAMUSCULAR; INTRAVENOUS PRN
Status: DISCONTINUED | OUTPATIENT
Start: 2020-09-03 | End: 2020-09-03 | Stop reason: SURG

## 2020-09-03 RX ORDER — HYDROMORPHONE HYDROCHLORIDE 1 MG/ML
0.4 INJECTION, SOLUTION INTRAMUSCULAR; INTRAVENOUS; SUBCUTANEOUS
Status: DISCONTINUED | OUTPATIENT
Start: 2020-09-03 | End: 2020-09-03 | Stop reason: HOSPADM

## 2020-09-03 RX ORDER — OXYCODONE HCL 5 MG/5 ML
5 SOLUTION, ORAL ORAL
Status: DISCONTINUED | OUTPATIENT
Start: 2020-09-03 | End: 2020-09-03 | Stop reason: HOSPADM

## 2020-09-03 RX ORDER — HYDRALAZINE HYDROCHLORIDE 20 MG/ML
5 INJECTION INTRAMUSCULAR; INTRAVENOUS
Status: DISCONTINUED | OUTPATIENT
Start: 2020-09-03 | End: 2020-09-03 | Stop reason: HOSPADM

## 2020-09-03 RX ORDER — ONDANSETRON 2 MG/ML
INJECTION INTRAMUSCULAR; INTRAVENOUS PRN
Status: DISCONTINUED | OUTPATIENT
Start: 2020-09-03 | End: 2020-09-03 | Stop reason: SURG

## 2020-09-03 RX ORDER — HYDROMORPHONE HYDROCHLORIDE 1 MG/ML
0.2 INJECTION, SOLUTION INTRAMUSCULAR; INTRAVENOUS; SUBCUTANEOUS
Status: DISCONTINUED | OUTPATIENT
Start: 2020-09-03 | End: 2020-09-03 | Stop reason: HOSPADM

## 2020-09-03 RX ORDER — OXYCODONE HCL 5 MG/5 ML
10 SOLUTION, ORAL ORAL
Status: DISCONTINUED | OUTPATIENT
Start: 2020-09-03 | End: 2020-09-03 | Stop reason: HOSPADM

## 2020-09-03 RX ORDER — DIPHENHYDRAMINE HYDROCHLORIDE 50 MG/ML
12.5 INJECTION INTRAMUSCULAR; INTRAVENOUS
Status: DISCONTINUED | OUTPATIENT
Start: 2020-09-03 | End: 2020-09-03 | Stop reason: HOSPADM

## 2020-09-03 RX ORDER — DEXAMETHASONE SODIUM PHOSPHATE 4 MG/ML
INJECTION, SOLUTION INTRA-ARTICULAR; INTRALESIONAL; INTRAMUSCULAR; INTRAVENOUS; SOFT TISSUE PRN
Status: DISCONTINUED | OUTPATIENT
Start: 2020-09-03 | End: 2020-09-03 | Stop reason: SURG

## 2020-09-03 RX ORDER — PHENYLEPHRINE HCL IN 0.9% NACL 0.5 MG/5ML
SYRINGE (ML) INTRAVENOUS PRN
Status: DISCONTINUED | OUTPATIENT
Start: 2020-09-03 | End: 2020-09-03 | Stop reason: SURG

## 2020-09-03 RX ORDER — HYDROMORPHONE HYDROCHLORIDE 1 MG/ML
0.1 INJECTION, SOLUTION INTRAMUSCULAR; INTRAVENOUS; SUBCUTANEOUS
Status: DISCONTINUED | OUTPATIENT
Start: 2020-09-03 | End: 2020-09-03 | Stop reason: HOSPADM

## 2020-09-03 RX ORDER — DEXMEDETOMIDINE HYDROCHLORIDE 100 UG/ML
INJECTION, SOLUTION INTRAVENOUS PRN
Status: DISCONTINUED | OUTPATIENT
Start: 2020-09-03 | End: 2020-09-03 | Stop reason: SURG

## 2020-09-03 RX ORDER — CELECOXIB 200 MG/1
400 CAPSULE ORAL ONCE
Status: COMPLETED | OUTPATIENT
Start: 2020-09-03 | End: 2020-09-03

## 2020-09-03 RX ORDER — LIDOCAINE HYDROCHLORIDE 20 MG/ML
INJECTION, SOLUTION EPIDURAL; INFILTRATION; INTRACAUDAL; PERINEURAL PRN
Status: DISCONTINUED | OUTPATIENT
Start: 2020-09-03 | End: 2020-09-03 | Stop reason: SURG

## 2020-09-03 RX ORDER — MEPERIDINE HYDROCHLORIDE 25 MG/ML
6.25 INJECTION INTRAMUSCULAR; INTRAVENOUS; SUBCUTANEOUS
Status: DISCONTINUED | OUTPATIENT
Start: 2020-09-03 | End: 2020-09-03 | Stop reason: HOSPADM

## 2020-09-03 RX ORDER — DEXAMETHASONE SODIUM PHOSPHATE 4 MG/ML
INJECTION, SOLUTION INTRA-ARTICULAR; INTRALESIONAL; INTRAMUSCULAR; INTRAVENOUS; SOFT TISSUE
Status: COMPLETED | OUTPATIENT
Start: 2020-09-03 | End: 2020-09-03

## 2020-09-03 RX ADMIN — CEFAZOLIN 2 G: 1 INJECTION, POWDER, FOR SOLUTION INTRAVENOUS at 10:19

## 2020-09-03 RX ADMIN — DEXAMETHASONE SODIUM PHOSPHATE 4 MG: 4 INJECTION, SOLUTION INTRAMUSCULAR; INTRAVENOUS at 09:58

## 2020-09-03 RX ADMIN — BUPIVACAINE HYDROCHLORIDE 20 ML: 5 INJECTION, SOLUTION EPIDURAL; INTRACAUDAL; PERINEURAL at 09:58

## 2020-09-03 RX ADMIN — FENTANYL CITRATE 50 MCG: 50 INJECTION, SOLUTION INTRAMUSCULAR; INTRAVENOUS at 11:04

## 2020-09-03 RX ADMIN — MIDAZOLAM HYDROCHLORIDE 2 MG: 1 INJECTION, SOLUTION INTRAMUSCULAR; INTRAVENOUS at 09:56

## 2020-09-03 RX ADMIN — FENTANYL CITRATE 25 MCG: 50 INJECTION, SOLUTION INTRAMUSCULAR; INTRAVENOUS at 10:31

## 2020-09-03 RX ADMIN — ACETAMINOPHEN 1000 MG: 500 TABLET, FILM COATED ORAL at 09:22

## 2020-09-03 RX ADMIN — DEXAMETHASONE SODIUM PHOSPHATE 6 MG: 4 INJECTION, SOLUTION INTRAMUSCULAR; INTRAVENOUS at 10:19

## 2020-09-03 RX ADMIN — CELECOXIB 400 MG: 200 CAPSULE ORAL at 09:22

## 2020-09-03 RX ADMIN — Medication 100 MCG: at 10:32

## 2020-09-03 RX ADMIN — EPHEDRINE SULFATE 25 MG: 50 INJECTION INTRAMUSCULAR; INTRAVENOUS; SUBCUTANEOUS at 11:30

## 2020-09-03 RX ADMIN — ONDANSETRON 4 MG: 2 INJECTION INTRAMUSCULAR; INTRAVENOUS at 10:19

## 2020-09-03 RX ADMIN — LIDOCAINE HYDROCHLORIDE 0.5 ML: 10 INJECTION, SOLUTION INFILTRATION; PERINEURAL at 09:23

## 2020-09-03 RX ADMIN — DEXMEDETOMIDINE HYDROCHLORIDE 25 MCG: 100 INJECTION, SOLUTION INTRAVENOUS at 10:20

## 2020-09-03 RX ADMIN — PROPOFOL 300 MG: 10 INJECTION, EMULSION INTRAVENOUS at 10:15

## 2020-09-03 RX ADMIN — GABAPENTIN 600 MG: 300 CAPSULE ORAL at 09:22

## 2020-09-03 RX ADMIN — POVIDONE-IODINE 15 ML: 10 SOLUTION TOPICAL at 09:22

## 2020-09-03 RX ADMIN — FENTANYL CITRATE 50 MCG: 50 INJECTION, SOLUTION INTRAMUSCULAR; INTRAVENOUS at 11:14

## 2020-09-03 RX ADMIN — FENTANYL CITRATE 25 MCG: 50 INJECTION, SOLUTION INTRAMUSCULAR; INTRAVENOUS at 10:14

## 2020-09-03 RX ADMIN — LIDOCAINE HYDROCHLORIDE 80 MG: 20 INJECTION, SOLUTION EPIDURAL; INFILTRATION; INTRACAUDAL; PERINEURAL at 10:15

## 2020-09-03 RX ADMIN — FENTANYL CITRATE 25 MCG: 50 INJECTION, SOLUTION INTRAMUSCULAR; INTRAVENOUS at 10:41

## 2020-09-03 RX ADMIN — SODIUM CHLORIDE, POTASSIUM CHLORIDE, SODIUM LACTATE AND CALCIUM CHLORIDE 1000 ML: 600; 310; 30; 20 INJECTION, SOLUTION INTRAVENOUS at 09:23

## 2020-09-03 RX ADMIN — BUPIVACAINE HYDROCHLORIDE 10 ML: 5 INJECTION, SOLUTION EPIDURAL; INTRACAUDAL; PERINEURAL at 10:00

## 2020-09-03 RX ADMIN — FENTANYL CITRATE 25 MCG: 50 INJECTION, SOLUTION INTRAMUSCULAR; INTRAVENOUS at 10:20

## 2020-09-03 RX ADMIN — MAGNESIUM SULFATE IN WATER 4 G: 40 INJECTION, SOLUTION INTRAVENOUS at 10:20

## 2020-09-03 ASSESSMENT — PAIN SCALES - GENERAL: PAIN_LEVEL: 0

## 2020-09-03 ASSESSMENT — FIBROSIS 4 INDEX: FIB4 SCORE: 0.26

## 2020-09-03 NOTE — ANESTHESIA POSTPROCEDURE EVALUATION
Patient: Coco Casper    Procedure Summary     Date: 09/03/20 Room / Location:  OR 02 / SURGERY [facility]    Anesthesia Start: 1011 Anesthesia Stop: 1144    Procedure: ORIF, ANKLE (Right Ankle) Diagnosis: (RIGHT TRIMALLEOLAR FRACTURE)    Surgeon: Gordon Saul M.D. Responsible Provider: Ana Rodriguez M.D.    Anesthesia Type: general, peripheral nerve block ASA Status: 1          Final Anesthesia Type: general, peripheral nerve block  Last vitals  BP   Blood Pressure: 115/70    Temp   36.2 °C (97.2 °F)    Pulse   Pulse: (!) 116   Resp   16    SpO2   94 %      Anesthesia Post Evaluation    Patient location during evaluation: PACU  Patient participation: complete - patient participated  Level of consciousness: awake and alert  Pain score: 0    Airway patency: patent  Anesthetic complications: no  Cardiovascular status: hemodynamically stable  Respiratory status: acceptable  Hydration status: euvolemic    PONV: none           Nurse Pain Score: 0 (NPRS)

## 2020-09-03 NOTE — ANESTHESIA TIME REPORT
Anesthesia Start and Stop Event Times     Date Time Event    9/3/2020 0843 Ready for Procedure     1011 Anesthesia Start     1144 Anesthesia Stop        Responsible Staff  09/03/20    Name Role Begin End    Ana Rodriguez M.D. Anesth 1011 1144        Preop Diagnosis (Free Text):  Pre-op Diagnosis     RIGHT TRIMALLEOLAR FRACTURE        Preop Diagnosis (Codes):    Post op Diagnosis  Closed right trimalleolar fracture      Premium Reason  Non-Premium    Comments:

## 2020-09-03 NOTE — ANESTHESIA PROCEDURE NOTES
Airway    Date/Time: 9/3/2020 10:16 AM  Performed by: Ana Rodriguez M.D.  Authorized by: Ana Rodriguez M.D.     Location:  OR  Urgency:  Elective  Indications for Airway Management:  Anesthesia      Spontaneous Ventilation: absent    Sedation Level:  Deep  Preoxygenated: Yes    Patient Position:  Sniffing  MILS Maintained Throughout: Yes    Mask Difficulty Assessment:  0 - not attempted  Final Airway Type:  Supraglottic airway  Final Supraglottic Airway:  Standard LMA    SGA Size:  4  Airway Seal Pressure (cm H2O):  20  Number of Attempts at Approach:  1

## 2020-09-03 NOTE — PROGRESS NOTES
Placed air walker boot on pt's right foot. If pt has questions regarding the boot or requires adjustment contact traction via Voalte or ext. [phone]

## 2020-09-03 NOTE — OR NURSING
1245 received from pacu  Up to chair without difficulty r foot dressing c/d/i  Boot intact resp spont  No c/o [id number] meets discharge criteria

## 2020-09-03 NOTE — ANESTHESIA PROCEDURE NOTES
Peripheral Block    Date/Time: 9/3/2020 9:58 AM  Performed by: Ana Rodriguez M.D.  Authorized by: Ana Rodriguez M.D.     Patient Location:  Pre-op  Start Time:  9/3/2020 9:58 AM  Reason for Block: at surgeon's request and post-op pain management    patient identified, IV checked, site marked, risks and benefits discussed, surgical consent, monitors and equipment checked, pre-op evaluation and timeout performed    Patient Position:  Supine  Prep: ChloraPrep    Monitoring:  Heart rate, continuous pulse ox and cardiac monitor  Block Region:  Lower Extremity  Lower Extremity - Block Type:  Selective FEMORAL nerve block at the Adductor Canal and SCIATIC nerve block, lateral approach    Laterality:  Right  Procedures: ultrasound guided  Image captured, interpreted and electronically stored.  Local Infiltration:  Lidocaine  Strength:  1 %  Dose:  3 ml  Block Type:  Single-shot  Needle Length:  100mm  Needle Gauge:  21 G  Needle Localization:  Ultrasound guidance  Injection Assessment:  Negative aspiration for heme, no paresthesia on injection, incremental injection and local visualized surrounding nerve on ultrasound  Evidence of intravascular injection: No     R/B/A of right sciatic nerve block at the popliteal lateral approach and adductor canal blocks was discussed with the patient. All questions were answered, and consent was signed. Surgeon marking complete and surgical consent signed. Pt was placed on monitors. Sedation was given per patient request. Pt was placed in supine position, and right lower thigh was prepped with Chloroprep. Using sterile technique, including sterile probe cover for ultrasound, sciatic nerve was identified at the level of bifurcation into popliteal and common peroneal nerves. Lidocaine 1% was used for skin infiltration. Landmarks were identified on ultrasound machine and echogenic needle was advanced from the lateral position. In 3-5 mL increments, local anesthetic was injected with  aspiration after each bolus. Aspiration was negative for heme. 20 mL of ropivacaine 0.5% with dexamethasone was used. Patient tolerated the procedure well. Attention was given to the femoral nerve at adductor canal.    Pt was placed in supine position, and inner right thigh was prepped with Chloroprep. Using sterile technique, including sterile probe cover for ultrasound, femoral nerve was identified within the adductor canal. Lidocaine 1% was used for skin infiltration. Landmarks were identified on ultrasound machine and echogenic needle was advanced. In 3-5 mL increments, local anesthetic was injected with aspiration after each bolus. Aspiration was negative for heme. 10 mL of ropivacaine 0.5% with dexamethasone was used. Patient tolerated the procedure well. Pt continues to be monitored until brought to the OR.

## 2020-09-03 NOTE — OR NURSING
1142: To PACU post right ankle ORIF w/ blocks. Pt is extubated, breathing is spontaneous and unlabored. Toes are pink and warm w/ brisk cap refill.  1150: Pt denies pain or nausea. Able to slightly sense touch to toes and is able to slightly move them.  1215: Remains pain/nausea free. Drinking fluids w/o issue.  1228: No change. Meets criteria for stage ll.

## 2020-09-03 NOTE — DISCHARGE INSTRUCTIONS
ACTIVITY: Rest and take it easy for the first 24 hours.  A responsible adult is recommended to remain with you during that time.  It is normal to feel sleepy.  We encourage you to not do anything that requires balance, judgment or coordination.    MILD FLU-LIKE SYMPTOMS ARE NORMAL. YOU MAY EXPERIENCE GENERALIZED MUSCLE ACHES, THROAT IRRITATION, HEADACHE AND/OR SOME NAUSEA.    FOR 24 HOURS DO NOT:  Drive, operate machinery or run household appliances.  Drink beer or alcoholic beverages.   Make important decisions or sign legal documents.    SPECIAL INSTRUCTIONS: Activity is to be non weight bearing. Elevate for 48-72 hours.    DIET: To avoid nausea, slowly advance diet as tolerated, avoiding spicy or greasy foods for the first day.  Add more substantial food to your diet according to your physician's instructions. INCREASE FLUIDS AND FIBER TO AVOID CONSTIPATION.    SURGICAL DRESSING/BATHING: Keep dressings clean and dry. May remove dressing on POD #5 (Tuesday) and then may get wet in shower but no soaking.    FOLLOW-UP APPOINTMENT:  A follow-up appointment should be arranged with your doctor; call to schedule.    You should CALL YOUR PHYSICIAN if you develop:  Fever greater than 101 degrees F.  Pain not relieved by medication, or persistent nausea or vomiting.  Excessive bleeding (blood soaking through dressing) or unexpected drainage from the wound.  Extreme redness or swelling around the incision site, drainage of pus or foul smelling drainage.  Inability to urinate or empty your bladder within 8 hours.    You should call 911 if you develop problems with breathing or chest pain.    If you are unable to contact your doctor or surgical center, you should go to the nearest emergency room or urgent care center.      Physician's telephone #: Dr. Morton [phone]    If any questions arise, call your doctor.  If your doctor is not available, please feel free to call the Surgical Center at [phone].  The Center  is open Monday through Friday from 7AM to 7PM.      You can also call the HEALTH HOTLINE open 24 hours/day, 7 days/week and speak to a nurse at [phone], or toll free at [phone].    A registered nurse may call you a few days after your surgery to see how you are doing after your procedure.    MEDICATIONS: Resume taking daily medication.  Take prescribed pain medication with food.  If no medication is prescribed, you may take non-aspirin pain medication if needed.  PAIN MEDICATION CAN BE VERY CONSTIPATING.  Take a stool softener or laxative such as senokot, pericolace, or milk of magnesia if needed.    Prescription givens.      Last pain medication given: None.    If your physician has prescribed pain medication that includes Acetaminophen (Tylenol), do not take additional Acetaminophen (Tylenol) while taking the prescribed medication.    Peripheral Nerve Block Discharge Instructions from Same Day Surgery and Inpatient :    What to Expect - Lower Extremity  · The block may cause you to experience numbness and weakness in your calf and foot on the same side as your surgery  · Numbness, tingling and / or weakness are all normal. For some people, this may be an unpleasant sensation  · These issues will be resolved when the local anesthetic wears off   · You may experience numbness and tingling in your thigh on the same side as your surgery if the block medicine was injected at your groin area  · Numbness will make it difficult to walk  · You may have problems with balance and walking so be very careful   · Follow your surgeon's direction regarding weight bearing on your surgical limb  · Be very careful with your numb limb  Precautions  · The numbness may affect your balance  · Be careful when walking or moving around  · Your leg may be weak: be very careful putting weight on it  · If your surgeon did not specify a time, you should not bear weight for 24 hours  · Be sure to ask for help when you need  "it  · It is better to have help than to fall and hurt yourself  Prevent Injury  · Protect the limb like a baby  · Beware of exposing your limb to extreme heat or cold or trauma  · The limb may be injured without you noticing because it is numb  · Keep the limb elevated whenever possible  · Do not sleep on the limb  · Change the position of the limb regularly  · Avoid putting pressure on your surgical limb  Pain Control  · The initial block on the day of surgery will make your extremity feel \"numb\"  · Any consecutive injection including prior to discharge from the hospital will make your extremity feel \"numb\"  · You may feel an aching or burning when the local anesthesia starts to wear off  · Take pain pills as prescribed by your surgeon  · Call your surgeon or anesthesiologist if you do not have adequate pain control      Depression / Suicide Risk    As you are discharged from this [facility], it is important to learn how to keep safe from harming yourself.    Recognize the warning signs:  · Abrupt changes in personality, positive or negative- including increase in energy   · Giving away possessions  · Change in eating patterns- significant weight changes-  positive or negative  · Change in sleeping patterns- unable to sleep or sleeping all the time   · Unwillingness or inability to communicate  · Depression  · Unusual sadness, discouragement and loneliness  · Talk of wanting to die  · Neglect of personal appearance   · Rebelliousness- reckless behavior  · Withdrawal from people/activities they love  · Confusion- inability to concentrate     If you or a loved one observes any of these behaviors or has concerns about self-harm, here's what you can do:  · Talk about it- your feelings and reasons for harming yourself  · Remove any means that you might use to hurt yourself (examples: pills, rope, extension cords, firearm)  · Get professional help from the community (Mental Health, Substance Abuse, " psychological counseling)  · Do not be alone:Call your Safe Contact- someone whom you trust who will be there for you.  · Call your local CRISIS HOTLINE 152-8179 or 502-007-9171  · Call your local Children's Mobile Crisis Response Team [place] (657) 611-4590 or www.[employer]  · Call the toll free National Suicide Prevention Hotlines   · National Suicide Prevention Lifeline 334-225-NTAS (9247)  National Hope Line Network 800-SUICIDE (461-7340)

## 2020-09-03 NOTE — ANESTHESIA PREPROCEDURE EVALUATION
26 yo F presenting for R ankle ORIF    Relevant Problems   No relevant active problems       Physical Exam    Airway   Mallampati: II  TM distance: >3 FB  Neck ROM: full       Cardiovascular - normal exam  Rhythm: regular  Rate: normal  (-) murmur     Dental - normal exam           Pulmonary - normal exam  Breath sounds clear to auscultation     Abdominal   (+) obese     Neurological - normal exam         Other findings: Pt with many ear and nose piercings that are difficult to remove. Discussed the risks associated with piercings, including loss, dislodgement, migration, burns. Pt understood risks and alternatives and agreed to move forward with surgery.          Anesthesia Plan    ASA 1       Plan - general and peripheral nerve block     Peripheral nerve block will be post-op pain control  Airway plan will be LMA        Induction: intravenous    Postoperative Plan: Postoperative administration of opioids is intended.    Pertinent diagnostic labs and testing reviewed    Informed Consent:    Anesthetic plan and risks discussed with patient.    Use of blood products discussed with: patient whom consented to blood products.

## 2020-09-04 NOTE — OP REPORT
DATE OF SERVICE:  09/03/2020    PREOPERATIVE DIAGNOSES:  1.  Right trimalleolar ankle fracture/dislocation.  2.  Obesity.    POSTOPERATIVE DIAGNOSES:  1.  Right trimalleolar ankle fracture/dislocation.  2.  Obesity.    SURGICAL PROCEDURES:  1.  Open reduction and internal fixation of right ankle.  2.  Open treatment of right distal tib-fib joint disruption with syndesmosis   screws.    SURGEON:  Gordon Saul MD    ASSISTANT:  None.    ANESTHESIOLOGIST:  Ana Rodriguez MD    ANESTHETIC:  General plus preoperative popliteal and saphenous nerve blocks.    TOURNIQUET TIME:  48 minutes.    ESTIMATED BLOOD LOSS:  25 mL.    INDICATIONS:  The patient is 25 years old.  She was hiking 2 days ago and   twisted her right ankle.  She had fracture/dislocation and was reduced in the   ER.  However, the ankle was unstable.  I recommended surgical treatment.    Risks were discussed with the patient and her mom.  These include bleeding,   infection, neurovascular injury, pain, stiffness, arthritis, nonunion,   malunion, thromboembolic phenomena, anesthetic and medical complications, etc.    PROCEDURE:  The patient was identified in the preoperative holding area, right   leg was marked.  Popliteal and saphenous nerve blocks were administered by   Dr. Rodriguez for postoperative pain management.  She was then taken to the   operating room and placed supine on the operating table.  General anesthetic   was administered.  Intravenous antibiotics were given.  Bump was placed under   the right hip with a nonsterile tourniquet on the right thigh.  The right   lower extremity was then prepped and draped in sterile fashion.  Time-out was   held to confirm the patient, identity and correct surgical site.    Right leg was elevated.  The tourniquet was inflated to 250 mmHg.  A   longitudinal incision was made along the lateral aspect of the ankle.  This   was carried down to the fascia overlying the peroneal tendons.  This fascia   was incised.   The peroneals were elevated off the fibula.  Fracture was   identified.  It was severely comminuted.  The posterior and lateral aspects   were just comminuted fragments, which gave no reduction read.  I reduced the   fracture and could palpate along the medial side of the fracture line.  The   reduction was in good position.  The other fragments fit into place.  I then   contoured a Synthes 1/3 tubular locking plate for the posterolateral aspect of   the fibula and placed 1 nonlocking screw distally and 1 nonlocking screw   proximally.  The fluoroscopic images showed good alignment.  Two additional   nonlocking screws were placed proximally and 1 locking screw placed distally.    I then dissected more distally as the initial x-ray suggested a disruption of   the syndesmosis with a posterior malleolus fracture.  I dissected around the   anterior aspect of the distal fibula and entered the distal tib-fib joint,   which was disrupted.  I manually manipulated the distal fibula back into   place, held it with two 1.6 mm K-wires, checked the fluoroscopic image.  I   then removed the nonlocking screw distally and placed a quadricortical   syndesmosis screw through the hole and then placed a second syndesmosis screw   distal to that.  The K-wires were removed.  The lateral side looked fine.    I then addressed the medial side with a longitudinal incision anteromedially,   carried posteriorly at the tip of the medial malleolus.  The fracture was   identified.  It was comminuted.  I cleaned it and then removed and folded   periosteum.  I irrigated the joint.  I then reduced the main medial malleolar   fragment.  I placed two 1.6 mm K-wires up the medial side.  There was an   anterior cortical fragment, which was free and I placed another 0.045-inch   K-wire to that.  These wires were cut, bent, pushed, flushed with the bone.    The fracture fragment could not accept anything larger.    The fluoroscopic images showed good  reduction and implant placement.  The   ankle mortise was well aligned.    Tourniquet was deflated.  Hemostasis was obtained.  The fascia laterally was   closed with 0 Vicryl.  Both skin incisions were closed with 2-0 Vicryl and 3-0   nylon.  Dressings were applied.  The patient was extubated and taken to the   recovery room in stable condition.    POSTOPERATIVE PLAN:  1.  Home when awake and comfortable.  2.  Elevation for 48-72 hours.  3.  Nonweightbearing right lower extremity for 6 weeks.  4.  Wound check, baseline radiographs and suture removal in approximately   10-14 days.       ____________________________________     MD MELANIA VEGA / KVNG    DD:  09/03/2020 18:22:22  DT:  09/03/2020 21:46:28    D#:  [id number]  Job#:  [id number]

## 2021-11-10 ENCOUNTER — TELEPHONE (OUTPATIENT)
Dept: SCHEDULING | Facility: IMAGING CENTER | Age: 26
End: 2021-11-10

## 2021-11-11 ENCOUNTER — OFFICE VISIT (OUTPATIENT)
Dept: MEDICAL GROUP | Facility: PHYSICIAN GROUP | Age: 26
End: 2021-11-11
Payer: COMMERCIAL

## 2021-11-11 VITALS
BODY MASS INDEX: 39.86 KG/M2 | TEMPERATURE: 98.5 F | HEART RATE: 73 BPM | HEIGHT: 66 IN | SYSTOLIC BLOOD PRESSURE: 116 MMHG | WEIGHT: 248 LBS | DIASTOLIC BLOOD PRESSURE: 80 MMHG | OXYGEN SATURATION: 97 % | RESPIRATION RATE: 16 BRPM

## 2021-11-11 DIAGNOSIS — R45.89 DEPRESSED MOOD: ICD-10-CM

## 2021-11-11 PROCEDURE — 99214 OFFICE O/P EST MOD 30 MIN: CPT | Performed by: PHYSICIAN ASSISTANT

## 2021-11-11 RX ORDER — ETONOGESTREL AND ETHINYL ESTRADIOL .12; .015 MG/D; MG/D
RING VAGINAL
COMMUNITY
Start: 2021-09-26 | End: 2021-11-11

## 2021-11-11 ASSESSMENT — PATIENT HEALTH QUESTIONNAIRE - PHQ9
CLINICAL INTERPRETATION OF PHQ2 SCORE: 2
SUM OF ALL RESPONSES TO PHQ QUESTIONS 1-9: 11
5. POOR APPETITE OR OVEREATING: 2 - MORE THAN HALF THE DAYS

## 2021-11-11 NOTE — PROGRESS NOTES
"Subjective:     CC: Establish care, BMI 40.0-44.9    HPI:   Coco presents today with     BMI 40.0-44.9, adult ([facility])  Wanting lab work done because of family history of diabetes.  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    Depressed mood  Started therapy a few months ago and did not feel like it was going well because she did not mesh with her counselor. Just got off of birth control two weeks ago and is wondering if this could be contributing.  Is interested in going back to counseling.  She has been having some issues with her job with some of her coworkers bullying her and this is also contributing to her depressed mood.  Denies SI      No past medical history on file.    Social History     Tobacco Use   • Smoking status: Never Smoker   • Smokeless tobacco: Never Used   Vaping Use   • Vaping Use: Never used   Substance Use Topics   • Alcohol use: Yes     Comment: socially   • Drug use: Yes     Comment: rare, marijuana       No current Epic-ordered outpatient medications on file.     No current [email or web address]-ordered facility-administered medications on file.       Allergies:  Patient has no known allergies.    Health Maintenance: Completed    ROS:  Gen: no fevers/chills  Pulm: no sob  CV: no chest pain  GI: no nausea/vomiting  MSk: no myalgias  Skin: no rash  Neuro: no headaches  Psych: Positive for depression    Objective:       Exam:  /80   Pulse 73   Temp 36.9 °C (98.5 °F) (Temporal)   Resp 16   Ht 1.676 m (5' 6\")   Wt 112 kg (248 lb)   LMP 10/28/2021   SpO2 97%   BMI 40.03 kg/m²  Body mass index is 40.03 kg/m².    Gen: Alert and oriented, No apparent distress.  Skin: Warm, dry, good turgor, no rashes in visible areas.  HEENT: Normocephalic. Eyes conjunctiva clear lids without ptosis, pupils equal and reactive to light " accommodation, ears normal shape and contour  Neck: Trachea midline, no masses, no thyromegaly  Lungs: Normal effort, CTA bilaterally, no wheezes, rhonchi, or rales  CV: Regular rate and rhythm. No murmurs, rubs, or gallops.  MSK: Normal gait, moves all extremities.  Neuro: Grossly non-focal.  Ext: No clubbing, cyanosis, edema.  Psych: Alert and oriented x3, normal affect and mood.       Depression Screening    Little interest or pleasure in doing things?  1 - several days   Feeling down, depressed , or hopeless? 1 - several days   Trouble falling or staying asleep, or sleeping too much?  3 - nearly every day   Feeling tired or having little energy?  1 - several days   Poor appetite or overeating?  2 - more than half the days   Feeling bad about yourself - or that you are a failure or have let yourself or your family down? 1 - several days   Trouble concentrating on things, such as reading the newspaper or watching television? 1 - several days   Moving or speaking so slowly that other people could have noticed.  Or the opposite - being so fidgety or restless that you have been moving around a lot more than usual?  1 - several days   Thoughts that you would be better off dead, or of hurting yourself?  0 - not at all   Patient Health Questionnaire Score: 11       If depressive symptoms identified deferred to follow up visit unless specifically addressed in assesment and plan.    Interpretation of PHQ-9 Total Score   Score Severity   1-4 No Depression   5-9 Mild Depression   10-14 Moderate Depression   15-19 Moderately Severe Depression   20-27 Severe Depression        Assessment & Plan:     26 y.o. female with the following -     1. BMI 40.0-44.9, adult (HCC)  Chronic.  Patient is here today for blood work which I have ordered.  She does have a family history of diabetes.  She also expresses some concern for her thyroid possibly because her weight has plateaued.  She is exercising regularly at work as she works in a  [employer].  Encouraged her to continue with healthy diet and working on portion control.  She did just get off of her birth control, so we discussed she may experience some weight loss from being off of hormonal birth control.  She is already established with a gynecologist.  We will follow up with the patient once I have the results of her blood work.   - Comp Metabolic Panel; Future  - VITAMIN D,25 HYDROXY; Future  - Lipid Profile; Future  - TSH WITH REFLEX TO FT4; Future  - HEMOGLOBIN A1C; Future    2. Depressed mood  Chronic.  Patient has a PHQ-9 score of 11 indicating moderate depression.  She was seeing a counselor in the past, however she did not feel like she got along well with her so she would like to reestablish with a new one.  I put in a referral for behavioral health for her to establish.  Also discussed in the future if she is not improving with counseling can consider medication, however patient is very aware of her feelings and is like she has a good plan to manage her depressed mood.  - Referral to Behavioral Health    I spent a total of 31 minutes with record review (including external notes and labs), exam, communication with the patient, communication with other providers, and documentation of this encounter.     Return for annual wellness or sooner if needed- after counseling .    Please note that this dictation was created using voice recognition software. I have made every reasonable attempt to correct obvious errors, but I expect that there are errors of grammar and possibly content that I did not discover before finalizing the note.    Electronically signed by Hailee Ahuja PA-C on November 11, 2021

## 2021-11-11 NOTE — ASSESSMENT & PLAN NOTE
Started therapy a few months ago and did not feel like it was going well because she did not mesh with her counselor. Just got off of birth control two weeks ago and is wondering if this could be contributing.  Is interested in going back to counseling.  She has been having some issues with her job with some of her coworkers bullying her and this is also contributing to her depressed mood.  Denies SI

## 2021-12-01 PROBLEM — T84.498A: Status: ACTIVE | Noted: 2021-12-01

## 2021-12-01 PROBLEM — S82.851A CLOSED TRIMALLEOLAR FRACTURE OF ANKLE, RIGHT, INITIAL ENCOUNTER: Status: ACTIVE | Noted: 2021-12-01

## 2024-01-17 PROBLEM — T84.498A MECHANICAL COMPLICATION INTERNAL FIXATION DEVICE LIKE NAIL, PLATE, ROD (HCC): Status: ACTIVE | Noted: 2024-01-17

## 2025-04-03 NOTE — ASSESSMENT & PLAN NOTE
The dilator was removed from the  right femoral vein. Wanting lab work done because of family history of diabetes.  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

## (undated) DEVICE — DRAPE LARGE 3 QUARTER - (20/CA)

## (undated) DEVICE — SUTURE 2-0 ETHILON FS - (36/BX) 18 INCH

## (undated) DEVICE — WRAP CO-FLEX 4IN X 5YD STERIL - SELF-ADHERENT (18/CA)

## (undated) DEVICE — BLADE SURGICAL #15 - (50/BX 3BX/CA)

## (undated) DEVICE — ELECTRODE DUAL RETURN W/ CORD - (50/PK)

## (undated) DEVICE — CANISTER SUCTION RIGID RED 1500CC (40EA/CA)

## (undated) DEVICE — BAG SPONGE COUNT 10.25 X 32 - BLUE (250/CA)

## (undated) DEVICE — SUTURE GENERAL

## (undated) DEVICE — PACK LOWER EXTREMITY - (2/CA)

## (undated) DEVICE — ELECTRODE 850 FOAM ADHESIVE - HYDROGEL RADIOTRNSPRNT (50/PK)

## (undated) DEVICE — PADDING CAST 4 IN STERILE - 4 X 4 YDS (24/CA)

## (undated) DEVICE — DRILL BIT SYN 2.8 165MM (5TX2+3TX1=13)

## (undated) DEVICE — NEEDLE NON SAFETY HYPO 22 GA X 1 1/2 IN (100/BX)

## (undated) DEVICE — PROTECTOR ULNA NERVE - (36PR/CA)

## (undated) DEVICE — GLOVE, LITE (PAIR)

## (undated) DEVICE — KIT ANESTHESIA W/CIRCUIT & 3/LT BAG W/FILTER (20EA/CA)

## (undated) DEVICE — HUMID-VENT HEAT AND MOISTURE EXCHANGE- (50/BX)

## (undated) DEVICE — NEPTUNE 4 PORT MANIFOLD - (20/PK)

## (undated) DEVICE — DRILL BIT 2.5 TWIST 310.25 (8TX2+1TX3=19)

## (undated) DEVICE — SUCTION INSTRUMENT YANKAUER BULBOUS TIP W/O VENT (50EA/CA)

## (undated) DEVICE — SENSOR SPO2 NEO LNCS ADHESIVE (20/BX) SEE USER NOTES

## (undated) DEVICE — GLOVE BIOGEL ECLIPSE PF LATEX SIZE 8.0  (50PR/BX)

## (undated) DEVICE — STOCKINET BIAS 4 IN STERILE - (20/CA)

## (undated) DEVICE — GLOVE BIOGEL INDICATOR SZ 8 SURGICAL PF LTX - (50/BX 4BX/CA)

## (undated) DEVICE — TUBE CONNECTING SUCTION - CLEAR PLASTIC STERILE 72 IN (50EA/CA)

## (undated) DEVICE — DRAPE C-ARM LARGE 41IN X 74 IN - (10/BX 2BX/CA)

## (undated) DEVICE — SUTURE 3-0 ETHILON FSLX 30 (36PK/BX)"

## (undated) DEVICE — SODIUM CHL IRRIGATION 0.9% 1000ML (12EA/CA)

## (undated) DEVICE — SUTURE 0 VICRYL PLUS CT-1 - 8 X 18 INCH (12/BX)

## (undated) DEVICE — GOWN WARMING STANDARD FLEX - (30/CA)

## (undated) DEVICE — HEAD HOLDER JUNIOR/ADULT

## (undated) DEVICE — LACTATED RINGERS INJ 1000 ML - (14EA/CA 60CA/PF)

## (undated) DEVICE — SUTURE 2-0 VICRYL PLUS CT-1 - 8 X 18 INCH(12/BX)

## (undated) DEVICE — WATER IRRIGATION STERILE 1000ML (12EA/CA)

## (undated) DEVICE — MASK ANESTHESIA ADULT  - (100/CA)